# Patient Record
(demographics unavailable — no encounter records)

---

## 2025-07-29 NOTE — ASSESSMENT
[FreeTextEntry1] : Examination. Obtained nail biopsy of b/l hallux dystrophic toe nails, will review results with patient when available. Aseptic debridement of dystrophic toe nails with sterile nippers and electric pedro to patient's tolerance. Discussed nail dystrophy possible onychomycosis and possible treatment with antifungal, pending nail biopsy results. Elongated toe nails with pain to pressure, pain in shoe gear. Aseptic debridement of hyperkeratotic skin lesions, calluses x 3, with sterile #10 blade, patient tolerated treatment well. Xrays right foot WB 3 views AP, Lat and MO, obtained, reviewed and discussed. Instructed patient to rest, ice and elevate the area. Instructed patient to wear supportive shoe gear with a wide toe box- patient in narrow, pointed, slide on loafers today, made shoe gear recommendations. Patient demonstrated verbal understanding of all instructions. Patient to return to office in 2 weeks.

## 2025-07-29 NOTE — PHYSICAL EXAM
[General Appearance - Alert] : alert [General Appearance - In No Acute Distress] : in no acute distress [General Appearance - Well Nourished] : well nourished [de-identified] : Right foot with pain at 2nd MPJ on palpation, no pain on ROM 2nd toe. Mild flexion contracture of toes 2-5 right foot. Mild bunion to right foot with no pain on palpation or dorsal medial bump, no pain on ROM of right 1st MPJ. No edema to right foot. Left foot with no pain on palpation and no pain on ROM. Right foot WB Xrays 3 views AP, Lat and MO- no acute fractures or dislocations noted, moderate HAV and mild digits 2-5 flexion contacrures noted with mild spurring to bases or lesser digit proximal phalanages noted. [FreeTextEntry1] : SWMF 10/10 b/l, light touch intact WNL b/l, Achilles tendon reflex intact b/l

## 2025-07-29 NOTE — HISTORY OF PRESENT ILLNESS
[FreeTextEntry1] : 91 year old female presents to office for multiple foot pain complaints. 1. She states 2 weeks ago she hurt her right foot getting out of bed- had Xrays of foot, ankle and knee with no fractures noted. She states the foot feels much better but has some pain at times neat the 2nd toe. She states the pain feels better after Epsom salt soaks. 2. Patient complaint of hard, thick, painful toe nails, present for a long time, to the right foot. She states these toe nails cause her pain often. 3. Left foot toe nails long and feel sharp, cause her pain in her shoes. 4. Hard, thick, painful skin on her right foot that causes her pain in shoes and when walking.

## 2025-07-29 NOTE — PHYSICAL EXAM
[General Appearance - Alert] : alert [General Appearance - In No Acute Distress] : in no acute distress [General Appearance - Well Nourished] : well nourished [de-identified] : Right foot with pain at 2nd MPJ on palpation, no pain on ROM 2nd toe. Mild flexion contracture of toes 2-5 right foot. Mild bunion to right foot with no pain on palpation or dorsal medial bump, no pain on ROM of right 1st MPJ. No edema to right foot. Left foot with no pain on palpation and no pain on ROM. Right foot WB Xrays 3 views AP, Lat and MO- no acute fractures or dislocations noted, moderate HAV and mild digits 2-5 flexion contacrures noted with mild spurring to bases or lesser digit proximal phalanages noted. [FreeTextEntry1] : SWMF 10/10 b/l, light touch intact WNL b/l, Achilles tendon reflex intact b/l

## 2025-07-29 NOTE — REVIEW OF SYSTEMS
[Negative] : Constitutional [FreeTextEntry9] : right foot pain [de-identified] : painful toe nails and caluses

## 2025-07-29 NOTE — REVIEW OF SYSTEMS
[Negative] : Constitutional [FreeTextEntry9] : right foot pain [de-identified] : painful toe nails and caluses